# Patient Record
Sex: MALE | Race: WHITE | URBAN - METROPOLITAN AREA
[De-identification: names, ages, dates, MRNs, and addresses within clinical notes are randomized per-mention and may not be internally consistent; named-entity substitution may affect disease eponyms.]

---

## 2019-12-09 ENCOUNTER — OFFICE VISIT (OUTPATIENT)
Dept: FAMILY MEDICINE CLINIC | Age: 44
End: 2019-12-09

## 2019-12-09 ENCOUNTER — HOSPITAL ENCOUNTER (OUTPATIENT)
Dept: LAB | Age: 44
Discharge: HOME | End: 2019-12-09

## 2019-12-09 VITALS
TEMPERATURE: 98.4 F | HEIGHT: 73 IN | BODY MASS INDEX: 22.93 KG/M2 | DIASTOLIC BLOOD PRESSURE: 76 MMHG | OXYGEN SATURATION: 98 % | SYSTOLIC BLOOD PRESSURE: 126 MMHG | WEIGHT: 173 LBS | HEART RATE: 75 BPM | RESPIRATION RATE: 18 BRPM

## 2019-12-09 DIAGNOSIS — D22.9 ATYPICAL MOLE: ICD-10-CM

## 2019-12-09 DIAGNOSIS — Z00.00 PHYSICAL EXAM: Primary | ICD-10-CM

## 2019-12-09 DIAGNOSIS — Z00.00 PHYSICAL EXAM: ICD-10-CM

## 2019-12-09 LAB
ALBUMIN SERPL-MCNC: 4.8 G/DL (ref 3.5–5)
ALBUMIN/GLOB SERPL: 1.5 {RATIO} (ref 1.1–2.2)
ALP SERPL-CCNC: 90 U/L (ref 45–117)
ALT SERPL-CCNC: 36 U/L (ref 12–78)
ANION GAP SERPL CALC-SCNC: 5 MMOL/L (ref 5–15)
AST SERPL-CCNC: 19 U/L (ref 15–37)
BILIRUB SERPL-MCNC: 0.8 MG/DL (ref 0.2–1)
BUN SERPL-MCNC: 20 MG/DL (ref 6–20)
BUN/CREAT SERPL: 13 (ref 12–20)
CALCIUM SERPL-MCNC: 9.8 MG/DL (ref 8.5–10.1)
CHLORIDE SERPL-SCNC: 105 MMOL/L (ref 97–108)
CHOLEST SERPL-MCNC: 216 MG/DL
CO2 SERPL-SCNC: 30 MMOL/L (ref 21–32)
CREAT SERPL-MCNC: 1.49 MG/DL (ref 0.7–1.3)
GLOBULIN SER CALC-MCNC: 3.2 G/DL (ref 2–4)
GLUCOSE SERPL-MCNC: 92 MG/DL (ref 65–100)
HDLC SERPL-MCNC: 76 MG/DL
HDLC SERPL: 2.8 {RATIO} (ref 0–5)
LDLC SERPL CALC-MCNC: 108 MG/DL (ref 0–100)
LIPID PROFILE,FLP: ABNORMAL
POTASSIUM SERPL-SCNC: 4.9 MMOL/L (ref 3.5–5.1)
PROT SERPL-MCNC: 8 G/DL (ref 6.4–8.2)
SODIUM SERPL-SCNC: 140 MMOL/L (ref 136–145)
TRIGL SERPL-MCNC: 160 MG/DL (ref ?–150)
VLDLC SERPL CALC-MCNC: 32 MG/DL

## 2019-12-09 NOTE — PROGRESS NOTES
1. Have you been to the ER, urgent care clinic since your last visit? Hospitalized since your last visit? UC for muscle pain    2. Have you seen or consulted any other health care providers outside of the [address] [place] [address] since your last visit? Include any pap smears or colon screening.  No       Chief Complaint   Patient presents with    Complete Physical

## 2019-12-09 NOTE — PROGRESS NOTES
Chief Complaint   Patient presents with    Complete Physical     he is a 38y.o. year old male who presents for evalution. He has noted pain off and on in the lower abd. Then has a watery stool and it goes away  He eats ount about 2 times per week    He also c/o itchy rashes off and on recently on both hands      Reviewed PmHx, RxHx, FmHx, SocHx, AllgHx and updated and dated in the chart. Aspirin yes ____   No____ N/A____    Patient Active Problem List    Diagnosis    Palpitations       Nurse notes were reviewed and copied and are correct  Review of Systems - negative except as listed above in the HPI    Objective:     Vitals:    12/09/19 1412   BP: 126/76   Pulse: 75   Resp: 18   Temp: 98.4 °F (36.9 °C)   TempSrc: Oral   SpO2: 98%   Weight: 173 lb (78.5 kg)   Height: 6' 0.5\" (1.842 m)     Physical Examination: General appearance - alert, well appearing, and in no distress  Mental status - alert, oriented to person, place, and time  Eyes - pupils equal and reactive, extraocular eye movements intact  Ears - bilateral TM's and external ear canals normal  Mouth - mucous membranes moist, pharynx normal without lesions  Neck - supple, no significant adenopathy  Chest - clear to auscultation, no wheezes, rales or rhonchi, symmetric air entry  Heart - normal rate, regular rhythm, normal S1, S2, no murmurs, rubs, clicks or gallops  Abdomen - soft, nontender, nondistended, no masses or organomegaly  Neurological - alert, oriented, normal speech, no focal findings or movement disorder noted  Musculoskeletal - no joint tenderness, deformity or swelling  Extremities - peripheral pulses normal, no pedal edema, no clubbing or cyanosis         Assessment/ Plan:   Diagnoses and all orders for this visit:    1. Physical exam  -     REFERRAL TO GASTROENTEROLOGY  -     METABOLIC PANEL, COMPREHENSIVE; Future  -     PSA W/ REFLX FREE PSA; Future  -     LIPID PANEL; Future    2.  Atypical mole  -     REFERRAL TO DERMATOLOGY ICD-10-CM ICD-9-CM    1. Physical exam Z00.00 V70.9 REFERRAL TO GASTROENTEROLOGY      METABOLIC PANEL, COMPREHENSIVE      PSA W/ REFLX FREE PSA      LIPID PANEL   2. Atypical mole D22.9 216.9 REFERRAL TO DERMATOLOGY       I have discussed the diagnosis with the patient and the intended plan as seen in the above orders. The patient has received an after-visit summary and questions were answered concerning future plans. Medication Side Effects and Warnings were discussed with patient: yes  Patient Labs were reviewed and or requested: yes  Patient Past Records were reviewed and or requested: yes        There are no Patient Instructions on file for this visit.     The patient verbalizes understanding and agrees with the plan of care        Patient has the advanced directives booklet to review

## 2019-12-11 LAB
PSA SERPL-MCNC: 0.8 NG/ML (ref 0–4)
REFLEX CRITERIA: NORMAL

## 2019-12-16 NOTE — PROGRESS NOTES
The kidney test is abnormal. Come back in 3 months to repeat this  The liver is normal  The cholesterol is slightly higher than goal. The triglyceride goal is less than 150 and yours is 160. The LDL goal is less than 100 and yours is 108. Avoid fried food and fast food and junk food.  Recheck this in 3 months